# Patient Record
Sex: FEMALE | Race: WHITE | NOT HISPANIC OR LATINO | Employment: UNEMPLOYED | URBAN - METROPOLITAN AREA
[De-identification: names, ages, dates, MRNs, and addresses within clinical notes are randomized per-mention and may not be internally consistent; named-entity substitution may affect disease eponyms.]

---

## 2020-10-27 PROBLEM — I63.511 ACUTE ISCHEMIC RIGHT MCA STROKE: Status: ACTIVE | Noted: 2020-10-27

## 2020-10-27 PROBLEM — I48.0 PAROXYSMAL ATRIAL FIBRILLATION: Status: ACTIVE | Noted: 2020-10-27

## 2020-10-27 PROBLEM — K92.2 GI BLEED: Status: ACTIVE | Noted: 2020-10-27

## 2020-10-28 PROBLEM — I63.9 STROKE: Status: ACTIVE | Noted: 2020-10-28

## 2020-10-29 PROBLEM — I61.9 HEMORRHAGIC STROKE: Status: ACTIVE | Noted: 2020-10-29

## 2020-11-04 PROBLEM — I63.9 STROKE: Status: ACTIVE | Noted: 2020-11-04

## 2020-11-04 PROBLEM — I63.9 STROKE: Status: RESOLVED | Noted: 2020-10-28 | Resolved: 2020-11-04

## 2020-11-05 PROBLEM — I69.391 DYSPHAGIA DUE TO RECENT CEREBROVASCULAR ACCIDENT: Status: ACTIVE | Noted: 2020-11-05

## 2020-11-05 PROBLEM — E11.49 TYPE 2 DIABETES MELLITUS WITH NEUROLOGIC COMPLICATION, WITHOUT LONG-TERM CURRENT USE OF INSULIN: Status: ACTIVE | Noted: 2020-11-05

## 2020-11-05 PROBLEM — I10 ESSENTIAL HYPERTENSION: Status: ACTIVE | Noted: 2020-11-05

## 2020-11-05 PROBLEM — E66.01 CLASS 2 SEVERE OBESITY WITH SERIOUS COMORBIDITY AND BODY MASS INDEX (BMI) OF 37.0 TO 37.9 IN ADULT: Status: ACTIVE | Noted: 2020-11-05

## 2020-11-06 PROBLEM — I63.9 STROKE: Status: ACTIVE | Noted: 2020-11-06

## 2020-11-06 PROBLEM — E83.52 HYPERCALCEMIA: Status: ACTIVE | Noted: 2020-11-06

## 2020-11-12 PROBLEM — I48.0 PAROXYSMAL ATRIAL FIBRILLATION: Status: RESOLVED | Noted: 2020-10-27 | Resolved: 2020-11-12

## 2020-11-13 ENCOUNTER — NURSE TRIAGE (OUTPATIENT)
Dept: ADMINISTRATIVE | Facility: CLINIC | Age: 55
End: 2020-11-13

## 2020-11-13 NOTE — TELEPHONE ENCOUNTER
Contacted pt on behalf of [facility] [employer]. Pt verified by first and last name and . Pt denies any fever, cough, or difficulty breathing since procedure. Advised pt if these symptoms do arise to contact [facility] or PCP. No follow up needed.    Reason for Disposition   Information only question and nurse able to answer    Additional Information   Negative: Nursing judgment   Negative: Nursing judgment   Negative: Nursing judgment   Negative: Nursing judgment    Protocols used: INFORMATION ONLY CALL - NO TRIAGE-A-[place]